# Patient Record
Sex: FEMALE | Race: WHITE
[De-identification: names, ages, dates, MRNs, and addresses within clinical notes are randomized per-mention and may not be internally consistent; named-entity substitution may affect disease eponyms.]

---

## 2017-03-30 ENCOUNTER — HOSPITAL ENCOUNTER (OUTPATIENT)
Dept: HOSPITAL 45 - C.LABMFLN | Age: 82
Discharge: HOME | End: 2017-03-30
Attending: INTERNAL MEDICINE
Payer: COMMERCIAL

## 2017-03-30 DIAGNOSIS — R53.83: Primary | ICD-10-CM

## 2017-03-30 LAB
EOSINOPHIL NFR BLD AUTO: 216 K/UL (ref 130–400)
HCT VFR BLD CALC: 40.4 % (ref 37–47)
MCH RBC QN AUTO: 32.8 PG (ref 25–34)
MCHC RBC AUTO-ENTMCNC: 32.7 G/DL (ref 32–36)
MCV RBC AUTO: 100.2 FL (ref 80–100)
PMV BLD AUTO: 11.1 FL (ref 7.4–10.4)
RBC # BLD AUTO: 4.03 M/UL (ref 4.2–5.4)
WBC # BLD AUTO: 7.6 K/UL (ref 4.8–10.8)

## 2017-04-11 ENCOUNTER — HOSPITAL ENCOUNTER (OUTPATIENT)
Dept: HOSPITAL 45 - C.LABMFLN | Age: 82
Discharge: HOME | End: 2017-04-11
Attending: FAMILY MEDICINE
Payer: COMMERCIAL

## 2017-04-11 DIAGNOSIS — R06.09: Primary | ICD-10-CM

## 2017-04-21 ENCOUNTER — HOSPITAL ENCOUNTER (OUTPATIENT)
Dept: HOSPITAL 45 - C.LABMFLN | Age: 82
Discharge: HOME | End: 2017-04-21
Attending: FAMILY MEDICINE
Payer: COMMERCIAL

## 2017-04-21 DIAGNOSIS — M79.89: ICD-10-CM

## 2017-04-21 DIAGNOSIS — I25.10: Primary | ICD-10-CM

## 2017-04-21 DIAGNOSIS — I48.91: ICD-10-CM

## 2017-04-21 LAB
ANION GAP SERPL CALC-SCNC: 4 MMOL/L (ref 3–11)
BUN SERPL-MCNC: 24 MG/DL (ref 7–18)
BUN/CREAT SERPL: 24.4 (ref 10–20)
CALCIUM SERPL-MCNC: 9.3 MG/DL (ref 8.5–10.1)
CHLORIDE SERPL-SCNC: 104 MMOL/L (ref 98–107)
CO2 SERPL-SCNC: 35 MMOL/L (ref 21–32)
CREAT SERPL-MCNC: 1 MG/DL (ref 0.6–1.2)
GLUCOSE SERPL-MCNC: 97 MG/DL (ref 70–99)
MAGNESIUM SERPL-MCNC: 2.6 MG/DL (ref 1.8–2.4)
POTASSIUM SERPL-SCNC: 3.9 MMOL/L (ref 3.5–5.1)
SODIUM SERPL-SCNC: 143 MMOL/L (ref 136–145)

## 2017-05-05 ENCOUNTER — HOSPITAL ENCOUNTER (OUTPATIENT)
Dept: HOSPITAL 45 - C.CATH | Age: 82
Discharge: HOME | End: 2017-05-05
Attending: INTERNAL MEDICINE
Payer: COMMERCIAL

## 2017-05-05 VITALS
HEIGHT: 59.02 IN | WEIGHT: 165.35 LBS | BODY MASS INDEX: 33.33 KG/M2 | HEIGHT: 59.02 IN | WEIGHT: 165.35 LBS | BODY MASS INDEX: 33.33 KG/M2

## 2017-05-05 VITALS — DIASTOLIC BLOOD PRESSURE: 58 MMHG | SYSTOLIC BLOOD PRESSURE: 110 MMHG | HEART RATE: 55 BPM | OXYGEN SATURATION: 97 %

## 2017-05-05 VITALS — HEART RATE: 69 BPM | DIASTOLIC BLOOD PRESSURE: 54 MMHG | OXYGEN SATURATION: 99 % | SYSTOLIC BLOOD PRESSURE: 120 MMHG

## 2017-05-05 VITALS
TEMPERATURE: 97.7 F | DIASTOLIC BLOOD PRESSURE: 75 MMHG | SYSTOLIC BLOOD PRESSURE: 149 MMHG | OXYGEN SATURATION: 98 % | HEART RATE: 62 BPM

## 2017-05-05 VITALS — HEART RATE: 52 BPM | SYSTOLIC BLOOD PRESSURE: 116 MMHG | OXYGEN SATURATION: 99 % | DIASTOLIC BLOOD PRESSURE: 60 MMHG

## 2017-05-05 DIAGNOSIS — G89.29: ICD-10-CM

## 2017-05-05 DIAGNOSIS — I11.0: ICD-10-CM

## 2017-05-05 DIAGNOSIS — E78.5: ICD-10-CM

## 2017-05-05 DIAGNOSIS — Z79.899: ICD-10-CM

## 2017-05-05 DIAGNOSIS — Z79.01: ICD-10-CM

## 2017-05-05 DIAGNOSIS — Z95.1: ICD-10-CM

## 2017-05-05 DIAGNOSIS — H90.8: ICD-10-CM

## 2017-05-05 DIAGNOSIS — I34.0: ICD-10-CM

## 2017-05-05 DIAGNOSIS — Z79.82: ICD-10-CM

## 2017-05-05 DIAGNOSIS — K21.9: ICD-10-CM

## 2017-05-05 DIAGNOSIS — E03.9: ICD-10-CM

## 2017-05-05 DIAGNOSIS — I07.1: ICD-10-CM

## 2017-05-05 DIAGNOSIS — M54.5: ICD-10-CM

## 2017-05-05 DIAGNOSIS — I50.9: ICD-10-CM

## 2017-05-05 DIAGNOSIS — E87.5: ICD-10-CM

## 2017-05-05 DIAGNOSIS — I25.10: ICD-10-CM

## 2017-05-05 DIAGNOSIS — I48.1: Primary | ICD-10-CM

## 2017-05-05 NOTE — ANESTHESIOLOGY PROGRESS NOTE
Anesthesia Post Op Note


Date & Time


May 5, 2017 at 07:39





Vital Signs


Pain Intensity:  0





 Vital Signs Past 12 Hours








  Date Time  Temp Pulse Resp B/P Pulse Ox O2 Delivery O2 Flow Rate FiO2


 


5/5/17 07:29  52 16 116/60 99 Nasal Cannula 4 


 


5/5/17 07:29  50 16 106/52 96 Nasal Cannula 4 


 


5/5/17 07:28  74 16 125/66 96 Nasal Cannula 4 


 


5/5/17 07:28  69 16 120/54 99 Nasal Cannula 4 


 


5/5/17 07:19 36.5 62 16 149/75 98 Room Air  











Notes


Mental Status:  alert / awake / arousable, participated in evaluation


Pt Amnestic to Procedure:  Yes


Nausea / Vomiting:  adequately controlled


Pain:  adequately controlled


Airway Patency, RR, SpO2:  stable & adequate


BP & HR:  stable & adequate


Hydration State:  stable & adequate


Anesthetic Complications:  no major complications apparent

## 2017-05-05 NOTE — HISTORY & PHYSICAL BRIDGE NOTE
H&P Re-Evaluation


Bridge Note:


I have examined the patient, reviewed the History & Physical and in the 

interval since the performance of the History & Physical I have noted the 

following changes of clinical significance: No changes noted. Patient still in 

atrial fibrillation this morning. Will proceed with electrical cardioversion.

## 2017-05-05 NOTE — CARDIOVERSION
DATE OF OPERATION:  05/05/2017

 

DATE OF PROCEDURE:  05/05/2017.  

 

PROCEDURE:  Elective electrical cardioversion.   

 

CLINICAL INDICATIONS:  Persistent atrial fibrillation.   

 

PROCEDURE PERFORMED BY:  Tommy Leach M.D.  

 

PROTOCOL:  The patient had anterior and posterior transcutaneous Electro

Patches placed on her.  After intravenous sedation was achieved under the

direction of Dr. Viet Claros, the patient underwent electrical

cardioversion with 200 joules of synchronized biphasic energy.  She awoke a

few minutes later without any complaints.  A post cardioversion

electrocardiogram was obtained.  

 

RESULTS:  With the single electrical shock her rhythm was converted from 

atrial fibrillation to sinus bradycardia.  The electrocardiogram post

procedure confirmed this.  

 

COMPLICATIONS:  None.  The patient awoke a few minutes after completion of

the cardioversion.  She had no complaints. She was hemodynamically stable

throughout the procedure.  Her oxygen saturation was stable throughout the

procedure.  She awoke without any complaints.  No neurologic or cardiac

symptoms or signs noted.  

 

PLAN:  The patient will be recovered in the cardiac catheterization

laboratory recovery unit.  She will be discharged home this morning.  She

will continue her usual medications including anticoagulation therapy.  She

will have outpatient followup with Dr. Leach as scheduled.

 

 

I attest to the content of the Intraoperative Record and any orders documented therein. Any exceptio
ns are noted below.

## 2017-05-05 NOTE — DISCHARGE INSTRUCTIONS
Discharge Instructions


Procedure


Procedure Date:


May 5, 2017.


Reason for Visit:


W/Khloe   Afib    **Dr Leach**.





Discharge


Discharge Date:


May 5, 2017.


Discharge Diagnosis:


Cardioversion of atrial fibrillation


Last Recorded Wt (Kilograms):  75





Anesthesia


Post Anesthesia Instructions:


If you have had General Anesthesia or IV Sedation:





*  Do not drive today.





*  Resume driving when surgeon permits.





*  Do not make important decisions or sign legal documents today.





*  Call surgeon for:


   1.  Temperature elevations greater than 101 degrees F.


   2.  Uncontrollable pain.


   3.  Excessive bleeding.


   4.  Persistent nausea and vomiting.


   5.  Medication intolerance (nausea, vomiting or rash).





*  For nausea and vomiting use only clear liquids such as: tea, soda, bouillon 

until


   nausea subsides, then gradually increase diet as tolerated.





*  If you have any concerns or questions, call your surgeon's office.  If 

physician is 


   unavailable and it is an emergency, call 911 or go to the nearest emergency 

room.





Instructions


Activity Recommendations:  resume regular activity


Recommended Home Diet:  resume previous diet, low sodium, low cholesterol


Allergies:  


Coded Allergies:  


     No Known Allergies (Verified  Allergy, Unknown, 3/3/03)





Follow Up


Additional Instructions:


Call Dr. Leach's office at [phone] for any questions or problems


Follow-up with:


Follow up with Dr. Leach as scheduled


[facility] Recommendations:


 


Call your doctor if:


*  Temperature above 101 degrees


*  Pain not relieved by pain medicine ordered


*  There is increased drainage or redness from any incision


*  You have any unanswered questions or concerns.





Your Doctors Instructions noted above were prepared by provider Tommy Leach.


Patient Signature Section:


 Patient Instructions Signature Page








Florence Robbins 











Patient (or Guardian) Signature/Date:____________________________________ I 

have read and understand the instructions given to me by my caregivers.








Caregiver/RN/Doctor Signature/Date:____________________________________








The above-named patient and/or guardian has received patient instructions on 

this date.


























+  Original Patient Signature Page (only) stays with chart.  Please make copy 

for patient.

## 2017-06-09 ENCOUNTER — HOSPITAL ENCOUNTER (OUTPATIENT)
Dept: HOSPITAL 45 - C.CATH | Age: 82
Discharge: HOME | End: 2017-06-09
Attending: INTERNAL MEDICINE
Payer: COMMERCIAL

## 2017-06-09 VITALS
OXYGEN SATURATION: 96 % | SYSTOLIC BLOOD PRESSURE: 153 MMHG | TEMPERATURE: 97.34 F | HEART RATE: 67 BPM | DIASTOLIC BLOOD PRESSURE: 70 MMHG

## 2017-06-09 VITALS — SYSTOLIC BLOOD PRESSURE: 126 MMHG | HEART RATE: 65 BPM | OXYGEN SATURATION: 57 % | DIASTOLIC BLOOD PRESSURE: 65 MMHG

## 2017-06-09 VITALS
WEIGHT: 163.14 LBS | HEIGHT: 59.02 IN | BODY MASS INDEX: 32.89 KG/M2 | BODY MASS INDEX: 32.89 KG/M2 | HEIGHT: 59.02 IN | WEIGHT: 163.14 LBS

## 2017-06-09 VITALS — DIASTOLIC BLOOD PRESSURE: 39 MMHG | HEART RATE: 47 BPM | OXYGEN SATURATION: 100 % | SYSTOLIC BLOOD PRESSURE: 113 MMHG

## 2017-06-09 VITALS — DIASTOLIC BLOOD PRESSURE: 70 MMHG | OXYGEN SATURATION: 100 % | SYSTOLIC BLOOD PRESSURE: 153 MMHG | HEART RATE: 49 BPM

## 2017-06-09 VITALS — SYSTOLIC BLOOD PRESSURE: 130 MMHG | DIASTOLIC BLOOD PRESSURE: 67 MMHG | HEART RATE: 56 BPM | OXYGEN SATURATION: 100 %

## 2017-06-09 VITALS — OXYGEN SATURATION: 100 % | SYSTOLIC BLOOD PRESSURE: 109 MMHG | HEART RATE: 49 BPM | DIASTOLIC BLOOD PRESSURE: 42 MMHG

## 2017-06-09 DIAGNOSIS — R00.2: ICD-10-CM

## 2017-06-09 DIAGNOSIS — E78.5: ICD-10-CM

## 2017-06-09 DIAGNOSIS — E03.9: ICD-10-CM

## 2017-06-09 DIAGNOSIS — I34.0: ICD-10-CM

## 2017-06-09 DIAGNOSIS — I10: ICD-10-CM

## 2017-06-09 DIAGNOSIS — Z79.01: ICD-10-CM

## 2017-06-09 DIAGNOSIS — I25.10: ICD-10-CM

## 2017-06-09 DIAGNOSIS — R06.09: ICD-10-CM

## 2017-06-09 DIAGNOSIS — I48.91: Primary | ICD-10-CM

## 2017-06-09 DIAGNOSIS — Z79.82: ICD-10-CM

## 2017-06-09 DIAGNOSIS — K21.9: ICD-10-CM

## 2017-06-09 NOTE — DISCHARGE INSTRUCTIONS
Discharge Instructions


Procedure


Procedure Date:


Jun 9, 2017.


Reason for Visit:


*W/Anesthesia*, Dr Leach To Do, Afib.





Discharge


Discharge Date:


Jun 9, 2017.


Discharge Diagnosis:


Atrial fibrillation.  Electrical cardioversion.


Last Recorded Wt (Kilograms):  74





Anesthesia


Post Anesthesia Instructions:


If you have had General Anesthesia or IV Sedation:





*  Do not drive today.





*  Resume driving when surgeon permits.





*  Do not make important decisions or sign legal documents today.





*  Call surgeon for:


   1.  Temperature elevations greater than 101 degrees F.


   2.  Uncontrollable pain.


   3.  Excessive bleeding.


   4.  Persistent nausea and vomiting.


   5.  Medication intolerance (nausea, vomiting or rash).





*  For nausea and vomiting use only clear liquids such as: tea, soda, bouillon 

until


   nausea subsides, then gradually increase diet as tolerated.





*  If you have any concerns or questions, call your surgeon's office.  If 

physician is 


   unavailable and it is an emergency, call 911 or go to the nearest emergency 

room.





Instructions


Activity Recommendations:  resume regular activity, driving or machine use 

limit (no driving until 6/10/2017)


Recommended Home Diet:  low sodium, low cholesterol


Allergies:  


Coded Allergies:  


     Lisinopril (Unverified  Allergy, Intermediate, ., 6/9/17)


Uncoded Allergies:  


     SULFA (Allergy, Unknown, ., 6/9/17)


Provider Instructions


Call Dr. Leach's office at [phone] for any questions or problems





Follow Up


Follow-up with:


Follow up with Dr. Leach as scheduled





[facility] Recommendations:


 


Call your doctor if:





*  Temperature above 101 degrees


*  Pain not relieved by pain medicine ordered


*  There is increased drainage or redness from any incision


*  You have any unanswered questions or concerns.





Your Doctors Instructions noted above were prepared by provider Tommy Leach.


Patient Signature Section:





 Patient Instructions Signature Page














Florence Robbins 











Patient (or Guardian) Signature/Date:____________________________________ I 

have read and understand the instructions given to me by my caregivers.








Caregiver/RN/Doctor Signature/Date:____________________________________











The above-named patient and/or guardian has received patient instructions on 

this date.





























+  Original Patient Signature Page (only) stays with chart.  Please make copy 

for patient.

## 2017-06-09 NOTE — ANESTHESIOLOGY PROGRESS NOTE
Anesthesia Post Op Note


Date & Time


Jun 9, 2017 at 08:07





Vital Signs


Pain Intensity:  0





Vital Signs Past 12 Hours








  Date Time  Temp Pulse Resp B/P (MAP) Pulse Ox O2 Delivery O2 Flow Rate FiO2


 


6/9/17 07:48  49 18 102/42 (62) 95 Room Air  


 


6/9/17 07:45  49 22 109/42 100 Nasal Cannula 4 


 


6/9/17 07:41  47 22 113/39 100 Nasal Cannula 4 


 


6/9/17 07:40  56 22 130/67 100 Nasal Cannula 4 


 


6/9/17 07:35  49 22 153/70 100 Nasal Cannula 4 


 


6/9/17 07:07 36.3 67 16 153/70 96 Room Air  











Notes


Mental Status:  alert / awake / arousable, participated in evaluation


Pt Amnestic to Procedure:  Yes


Nausea / Vomiting:  adequately controlled


Pain:  adequately controlled


Airway Patency, RR, SpO2:  stable & adequate


BP & HR:  stable & adequate


Hydration State:  stable & adequate


Anesthetic Complications:  no major complications apparent

## 2017-06-09 NOTE — CARDIOVERSION
DATE OF OPERATION:  06/09/2017

 

DATE OF PROCEDURE:  06/09/2017.

 

PROCEDURE:  Elective electrical cardioversion.    

 

CLINICAL INDICATIONS:  Atrial fibrillation.

 

PROCEDURE PERFORMED BY:  Tommy Leach M.D.   

 

The patient had previously undergone electrical cardioversion for atrial

fibrillation.  She reverted back to atrial fibrillation a few days later. 

She was then started on amiodarone 200 mg daily.  She now returns for repeat

electrical cardioversion on amiodarone therapy.  

 

PROTOCOL:  The patient was given adequate deep sedation by the

anesthesiologist.  Intravenous propofol was administered.  Anterior and

posterior transcutaneous Electro Patches had been placed on the patient's

thorax.  After deep sedation was obtained she was given 1 shock of 150 joules

of synchronized biphasic energy.  Her rhythm converted to sinus rhythm and

sinus bradycardia with premature supraventricular beats.  She was

hemodynamically stable throughout the procedure.  She awoke a few minutes

later without any complaints.  No cardiac or neurologic complaints.  Her

oxygen saturation and vital signs were stable.  

 

Approximately 20 minutes after the initially successful electrical

cardioversion her rhythm reverted back to atrial fibrillation with a slow to

controlled ventricular response.  She remained hemodynamically stable.     

 

CONCLUSIONS:  

1.  Initially successful electrical cardioversion of atrial fibrillation to

sinus rhythm.  

2.  Reversion back to atrial fibrillation approximately 20 minutes after the

initially successful cardioversion.  

 

PLAN:  

1.  The patient will be discharged to home.  

2.  Her amiodarone will be increased from 200 mg daily to 200 mg b.i.d.

3.  Her metoprolol tartrate dose will be decreased to 25 mg b.i.d. 

4.  Her digoxin will be discontinued.  

5.  She will have outpatient followup with me in approximately 4 weeks.  

6.  After she is on amiodarone 200 mg b.i.d. for 4 weeks and if she continues

in atrial fibrillation she will then undergo another attempt at electrical

cardioversion.

 

 

I attest to the content of the Intraoperative Record and any orders documented therein. Any exception
s are noted below.

## 2017-07-10 ENCOUNTER — HOSPITAL ENCOUNTER (OUTPATIENT)
Dept: HOSPITAL 45 - C.CATH | Age: 82
Discharge: HOME | End: 2017-07-10
Attending: INTERNAL MEDICINE
Payer: COMMERCIAL

## 2017-07-10 VITALS
WEIGHT: 165.35 LBS | BODY MASS INDEX: 32.46 KG/M2 | BODY MASS INDEX: 32.46 KG/M2 | HEIGHT: 60 IN | WEIGHT: 165.35 LBS | HEIGHT: 60 IN

## 2017-07-10 VITALS — DIASTOLIC BLOOD PRESSURE: 52 MMHG | HEART RATE: 62 BPM | OXYGEN SATURATION: 96 % | SYSTOLIC BLOOD PRESSURE: 118 MMHG

## 2017-07-10 VITALS — DIASTOLIC BLOOD PRESSURE: 62 MMHG | OXYGEN SATURATION: 100 % | SYSTOLIC BLOOD PRESSURE: 110 MMHG | HEART RATE: 48 BPM

## 2017-07-10 VITALS — OXYGEN SATURATION: 100 % | SYSTOLIC BLOOD PRESSURE: 72 MMHG | HEART RATE: 47 BPM | DIASTOLIC BLOOD PRESSURE: 34 MMHG

## 2017-07-10 VITALS
TEMPERATURE: 97.7 F | OXYGEN SATURATION: 98 % | DIASTOLIC BLOOD PRESSURE: 84 MMHG | SYSTOLIC BLOOD PRESSURE: 152 MMHG | HEART RATE: 93 BPM

## 2017-07-10 VITALS — SYSTOLIC BLOOD PRESSURE: 128 MMHG | HEART RATE: 84 BPM | OXYGEN SATURATION: 100 % | DIASTOLIC BLOOD PRESSURE: 75 MMHG

## 2017-07-10 VITALS — DIASTOLIC BLOOD PRESSURE: 45 MMHG | OXYGEN SATURATION: 95 % | SYSTOLIC BLOOD PRESSURE: 89 MMHG | HEART RATE: 52 BPM

## 2017-07-10 VITALS — HEART RATE: 79 BPM | SYSTOLIC BLOOD PRESSURE: 130 MMHG | DIASTOLIC BLOOD PRESSURE: 70 MMHG | OXYGEN SATURATION: 100 %

## 2017-07-10 VITALS — SYSTOLIC BLOOD PRESSURE: 94 MMHG | HEART RATE: 53 BPM | DIASTOLIC BLOOD PRESSURE: 42 MMHG | OXYGEN SATURATION: 96 %

## 2017-07-10 VITALS — SYSTOLIC BLOOD PRESSURE: 124 MMHG | DIASTOLIC BLOOD PRESSURE: 84 MMHG | HEART RATE: 82 BPM | OXYGEN SATURATION: 100 %

## 2017-07-10 DIAGNOSIS — R00.1: ICD-10-CM

## 2017-07-10 DIAGNOSIS — K21.9: ICD-10-CM

## 2017-07-10 DIAGNOSIS — G89.29: ICD-10-CM

## 2017-07-10 DIAGNOSIS — H90.8: ICD-10-CM

## 2017-07-10 DIAGNOSIS — M79.89: ICD-10-CM

## 2017-07-10 DIAGNOSIS — T44.7X5A: ICD-10-CM

## 2017-07-10 DIAGNOSIS — Z79.899: ICD-10-CM

## 2017-07-10 DIAGNOSIS — Z95.1: ICD-10-CM

## 2017-07-10 DIAGNOSIS — E03.9: ICD-10-CM

## 2017-07-10 DIAGNOSIS — Z79.01: ICD-10-CM

## 2017-07-10 DIAGNOSIS — M54.5: ICD-10-CM

## 2017-07-10 DIAGNOSIS — E87.5: ICD-10-CM

## 2017-07-10 DIAGNOSIS — I10: ICD-10-CM

## 2017-07-10 DIAGNOSIS — E78.5: ICD-10-CM

## 2017-07-10 DIAGNOSIS — I25.10: ICD-10-CM

## 2017-07-10 DIAGNOSIS — I08.1: ICD-10-CM

## 2017-07-10 DIAGNOSIS — I48.1: Primary | ICD-10-CM

## 2017-07-10 NOTE — CARDIOVERSION
Electricial Cardioversion Rpt


Date of Service:


7/10/17


Electrical Cardioversion Rprt


Indications: Persistent atrial fibrillation.


Procedure: After deep sedation with propofol ( given by anesthesiologist), one 

shock of 150 joules synchronized biphasic energy given by transcutaneous 

electrode pads anteriorly and posteriorly placed on thorax.


Results: Rhythm converted to sinus bradycardia.  The electrocardiogram post 

conversion showed sinus bradycardia rate of 47 beats per minute.  Normal FL 

interval.  post procedure the patient initially had a low blood pressure.  This 

subsequently improved.  She had no neurologic complaints or any other symptoms 

suggestive of a thromboembolic event post procedure. At the time of discharge 

she was hemodynamically stable.   


Complications: none.


Plan: DC metoprolol secondary to sinus bradycardia. Continue amiodarone 200 mg 

PO BID and Xarelto. Outpatient follow up with me.

## 2017-07-10 NOTE — ANESTHESIOLOGY PROGRESS NOTE
Anesthesia Post Op Note


Date & Time


Jul 10, 2017 at 08:47





Vital Signs


Pain Intensity:  0





Vital Signs Past 12 Hours








  Date Time  Temp Pulse Resp B/P (MAP) Pulse Ox O2 Delivery O2 Flow Rate FiO2


 


7/10/17 08:30  54 16 100/48 (65) 96 Room Air  


 


7/10/17 08:15  58 16 102/62 (75) 96 Room Air  


 


7/10/17 08:00  53 16 91/44 (60) 96 Room Air  


 


7/10/17 07:55  53 18 94/42 96 Room Air  


 


7/10/17 07:50  52 16 89/45 95 Room Air  


 


7/10/17 07:45  47 16 72/34 100 Nasal Cannula 4 


 


7/10/17 07:43  48 16 110/62 100 Nasal Cannula 4 


 


7/10/17 07:40  82 16 124/84 100 Nasal Cannula 4 


 


7/10/17 07:35  79 16 130/70 100 Nasal Cannula 4 


 


7/10/17 07:30  84 16 128/75 100 Nasal Cannula 4 


 


7/10/17 07:02 36.5 93 18 152/84 98 Room Air  











Notes


Mental Status:  alert / awake / arousable, participated in evaluation


Pt Amnestic to Procedure:  Yes


Nausea / Vomiting:  adequately controlled


Pain:  adequately controlled


Airway Patency, RR, SpO2:  stable & adequate


BP & HR:  stable & adequate


Hydration State:  stable & adequate


Anesthetic Complications:  no major complications apparent


18G IV was placed by the cath lab staff and was free flowing without pain.  

Patient had no response to 40mg IV propofol at which point the IV site was 

examined and felt to be probably infiltrated.  The catheter was aspirated and 

removed and a second 22G IV was placed in the L hand.  This was used without 

incident for the case.  I spoke with the patient and explained signs of 

infection and necrosis to be aware and to seek prompt medical evaluation if 

these signs present over the next few days.  I also relayed these instructions 

to her friend who presented with her to the hospital today.

## 2017-07-10 NOTE — DISCHARGE INSTRUCTIONS
Discharge Instructions


Procedure


Procedure Date:


Jul 10, 2017.


Reason for Visit:


A Fib   *  *W/Anesthesia.





Discharge


Discharge Date:


Jul 10, 2017.


Discharge Diagnosis:


Atrial fibrillation, cardioversion


Last Recorded Wt (Kilograms):  75





Anesthesia


Post Anesthesia Instructions:


If you have had General Anesthesia or IV Sedation:





*  Do not drive today.





*  Resume driving when surgeon permits.





*  Do not make important decisions or sign legal documents today.





*  Call surgeon for:


   1.  Temperature elevations greater than 101 degrees F.


   2.  Uncontrollable pain.


   3.  Excessive bleeding.


   4.  Persistent nausea and vomiting.


   5.  Medication intolerance (nausea, vomiting or rash).





*  For nausea and vomiting use only clear liquids such as: tea, soda, bouillon 

until


   nausea subsides, then gradually increase diet as tolerated.





*  If you have any concerns or questions, call your surgeon's office.  If 

physician is 


   unavailable and it is an emergency, call 911 or go to the nearest emergency 

room.





Instructions


Activity Recommendations:  resume regular activity


Recommended Home Diet:  low sodium, low cholesterol


Allergies:  


Coded Allergies:  


     Lisinopril (Unverified  Allergy, Intermediate, ., 6/9/17)


Uncoded Allergies:  


     SULFA (Allergy, Unknown, ., 6/9/17)





Follow Up


Follow-up with:


Follow up with Dr. Leach as scheduled. Call Dr. Leach at [phone] for any 

questions.





[facility] Recommendations:


 


Call your doctor if:





*  Temperature above 101 degrees


*  Pain not relieved by pain medicine ordered


*  There is increased drainage or redness from any incision


*  You have any unanswered questions or concerns.





Your Doctors Instructions noted above were prepared by provider Tommy Leach.


Patient Signature Section:





 Patient Instructions Signature Page














Florence Robbins 











Patient (or Guardian) Signature/Date:____________________________________ I 

have read and understand the instructions given to me by my caregivers.








Caregiver/RN/Doctor Signature/Date:____________________________________











The above-named patient and/or guardian has received patient instructions on 

this date.





























+  Original Patient Signature Page (only) stays with chart.  Please make copy 

for patient.

## 2017-10-30 ENCOUNTER — HOSPITAL ENCOUNTER (OUTPATIENT)
Dept: HOSPITAL 45 - C.LABMFLN | Age: 82
Discharge: HOME | End: 2017-10-30
Attending: FAMILY MEDICINE
Payer: COMMERCIAL

## 2017-10-30 DIAGNOSIS — E03.9: Primary | ICD-10-CM

## 2018-08-14 ENCOUNTER — HOSPITAL ENCOUNTER (OUTPATIENT)
Dept: HOSPITAL 45 - C.LABMFLN | Age: 83
Discharge: HOME | End: 2018-08-14
Attending: FAMILY MEDICINE
Payer: COMMERCIAL

## 2018-08-14 DIAGNOSIS — I50.9: ICD-10-CM

## 2018-08-14 DIAGNOSIS — R05: ICD-10-CM

## 2018-08-14 DIAGNOSIS — R07.89: Primary | ICD-10-CM

## 2018-08-14 LAB
BASOPHILS # BLD: 0.02 K/UL (ref 0–0.2)
BASOPHILS NFR BLD: 0.2 %
EOS ABS #: 0.13 K/UL (ref 0–0.5)
EOSINOPHIL NFR BLD AUTO: 214 K/UL (ref 130–400)
HCT VFR BLD CALC: 42.5 % (ref 37–47)
HGB BLD-MCNC: 14.1 G/DL (ref 12–16)
IG#: 0.01 K/UL (ref 0–0.02)
IMM GRANULOCYTES NFR BLD AUTO: 19.7 %
LYMPHOCYTES # BLD: 1.58 K/UL (ref 1.2–3.4)
MCH RBC QN AUTO: 32.9 PG (ref 25–34)
MCHC RBC AUTO-ENTMCNC: 33.2 G/DL (ref 32–36)
MCV RBC AUTO: 99.1 FL (ref 80–100)
MONO ABS #: 0.94 K/UL (ref 0.11–0.59)
MONOCYTES NFR BLD: 11.7 %
NEUT ABS #: 5.35 K/UL (ref 1.4–6.5)
NEUTROPHILS # BLD AUTO: 1.6 %
NEUTROPHILS NFR BLD AUTO: 66.7 %
PMV BLD AUTO: 11.5 FL (ref 7.4–10.4)
RED CELL DISTRIBUTION WIDTH CV: 15.3 % (ref 11.5–14.5)
RED CELL DISTRIBUTION WIDTH SD: 55.4 FL (ref 36.4–46.3)
WBC # BLD AUTO: 8.03 K/UL (ref 4.8–10.8)